# Patient Record
Sex: MALE
[De-identification: names, ages, dates, MRNs, and addresses within clinical notes are randomized per-mention and may not be internally consistent; named-entity substitution may affect disease eponyms.]

---

## 2018-12-17 ENCOUNTER — HOSPITAL ENCOUNTER (EMERGENCY)
Dept: HOSPITAL 42 - ED | Age: 28
Discharge: HOME | End: 2018-12-17
Payer: COMMERCIAL

## 2018-12-17 VITALS — OXYGEN SATURATION: 100 %

## 2018-12-17 VITALS
HEART RATE: 68 BPM | RESPIRATION RATE: 16 BRPM | DIASTOLIC BLOOD PRESSURE: 69 MMHG | TEMPERATURE: 98 F | SYSTOLIC BLOOD PRESSURE: 126 MMHG

## 2018-12-17 DIAGNOSIS — T78.40XA: Primary | ICD-10-CM

## 2018-12-17 NOTE — ED PDOC
Arrival/HPI





- History of Present Illness


Narrative History of Present Illness (Text): 





12/17/18 08:23


28M with a Past medical history of peanut allergy opresents to the Emergency 

department with a one day hx of facial swelling after taking a shot of tequila. 

Pt says cheaop tequila gives him an allergic reaction that rtesults in facial 

swelling, eyes puffiness and tears as well as throat swelling. Pt doesnt report 

taking any allergy medications after the onset of his reaction, he drank more 

beer and took another shot of alcohol instead. The reaction started at 7pm, pt 

went to sleep at 11pm, says he has been improving slightly. When he wo0ke up 

this morning his family encouraged him to come in. 


12/17/18 08:52








<Guerrero Marcus - Last Filed: 12/17/18 08:38>





<Anastasiia Taylor - Last Filed: 12/17/18 17:25>





- General


Chief Complaint: Allergic Reaction


Time Seen by Provider: 12/17/18 08:12





Past Medical History





- Provider Review


Nursing Documentation Reviewed: Yes





- Infectious Disease


Hx of Infectious Diseases: None





- Cardiac


Hx Cardiac Disorders: No





- Pulmonary


Hx Respiratory Disorders: No





- Neurological


Hx Neurological Disorder: No





- Hematological/Oncological


Hx Blood Disorders: No





- Psychiatric


Hx Substance Use: No





- Anesthesia


Hx Anesthesia: No





<Guerrero Marcus - Last Filed: 12/17/18 08:38>





Family/Social History





- Physician Review


Nursing Documentation Reviewed: Yes


Smoking Status: Unknown If Ever Smoked


Hx Alcohol Use: No


Hx Substance Use: No





<Guerrero Marcus - Last Filed: 12/17/18 08:38>





- Physician Review


Nursing Documentation Reviewed: Yes


Family/Social History: No Known Family HX





<Anastasiia Taylor - Last Filed: 12/17/18 17:25>





Allergies/Home Meds





<Guerrero Marcus - Last Filed: 12/17/18 08:38>





<Anastasiia Taylor - Last Filed: 12/17/18 17:25>


Allergies/Adverse Reactions: 


Allergies





peanut Allergy (Verified 12/17/18 08:46)


   ANAPHYLAXIS











Review of Systems





- Review of Systems


Constitutional: Normal.  absent: Fatigue, Weight Change, Fevers, Night Sweats, 

Other


Eyes: Normal (excessive lacrimation).  absent: Vision Changes, Photophobia, Eye 

Pain, Other


ENT: Voice Changes, Rhinorrhea


Respiratory: Cough, Sputum.  absent: SOB


Cardiovascular: Normal.  absent: Chest Pain


Gastrointestinal: Normal.  absent: Abdominal Pain, Stool Changes, Constipation, 

Diarrhea, Nausea, Vomiting, Appetite Changes, Hematochezia, Hematemesis, 

Anorexia, Food Intolerance, Other


Genitourinary Male: Normal.  absent: Dysuria, Frequency, Hematuria, Urinary 

Output Changes, Other


Musculoskeletal: Normal.  absent: Arthralgias, Back Pain, Neck Pain, Joint 

Swelling, Myalgias, Other


Skin: Normal.  absent: Rash, Pruritis, Skin Lesions, Laceration, Abscess, Ulcer,

Cellulitis, Other


Neurological: Normal.  absent: Headache, Dizziness, Focal Weakness, Gait 

Changes, Speech Changes, SC, Facial Droop, DE, Disequilibrium, SE, Seizure, 

Other


Psychiatric: Normal.  absent: Anxiety, Depression, Suicidal Ideation, Other





<Guerrero Marcus - Last Filed: 12/17/18 08:38>





Physical Exam





Vital Signs











  Temp Pulse Resp BP Pulse Ox


 


 12/17/18 08:01  98.2 F  84  18  143/86  100














- Systems Exam


Head: Present: Atraumatic, Normocephalic, Swelling (facial swelling).  No: 

Ecchymosis


Pupils: Present: PERRL


Extroacular Muscles: Present: EOMI


Conjunctiva: Present: Injected


Mouth: Present: Moist Mucous Membranes, Normal Tounge.  No: Drooling


Pharnyx: Present: Muffled/Hoarse Voice, Other (tonsils removed).  No: ERYTHEMA, 

Strider


Respiratory/Chest: Present: Clear to Auscultation.  No: Respiratory Distress, 

Wheezes, Rales, Rhonchi, Tachypneic


Cardiovascular: Present: Regular Rate and Rhythm, Normal S1, S2.  No: Murmurs


Abdomen: No: Tenderness, Distention, Normal Bowel Sounds


Neurological: Present: GCS=15, CN II-XII Intact, Speech Normal


Skin: Present: Warm, Dry, Normal Color.  No: Rashes


Psychiatric: Present: Oriented x 3, Normal Insight, Normal Concentration





<Guerrero Marcus - Last Filed: 12/17/18 08:38>





Vital Signs











  Temp Pulse Resp BP Pulse Ox


 


 12/17/18 09:27  98 F  68  16  126/69  100


 


 12/17/18 08:01  98.2 F  84  18  143/86  100














<Anastasiia Taylor - Last Filed: 12/17/18 17:25>





Medical Decision Making





- Medication Orders


Current Medication Orders: 





12/17/18 08:51


Pednisone oral 60mg today, 40mg for 4 days





Pepcid 20mg oral take once daily for 5 days


Benadryl 25mg oral take once daily for 5 days





<Guerrero Marcus - Last Filed: 12/17/18 08:38>


ED Course and Treatment: 








A 28 year old male who presents to the emergency department with a complaint of 

facial swelling s/p drinking tequila. In agreement with resident note, which 

includes further HPI details. Patient was seen and evaluated with resident, came

up with plan and treatment together.





- Medication Orders


Current Medication Orders: 














Discontinued Medications





Prednisone (Prednisone Tab)  60 mg PO STAT ONE


   Stop: 12/17/18 08:59


   Last Admin: 12/17/18 09:22  Dose: 60 mg











<Anastasiia Taylor - Last Filed: 12/17/18 17:25>





- PA / NP / Resident Statement


MD/ has examined the patient and agrees with the treatment plan.





<Anastasiia Taylor - Last Filed: 12/17/18 17:25>





Disposition/Present on Arrival





- Present on Arrival


Any Indicators Present on Arrival: No


History of DVT/PE: No


History of Uncontrolled Diabetes: No


Urinary Catheter: No


History of Decub. Ulcer: No


History Surgical Site Infection Following: None





- Disposition


Have Diagnosis and Disposition been Completed?: Yes


Disposition Time: 08:56


Patient Plan: Discharge





<Guerrero Marcus - Last Filed: 12/17/18 08:38>





<Anastasiia Taylor - Last Filed: 12/17/18 17:25>





- Disposition


Diagnosis: 


 Allergic reaction





Disposition: HOME/ ROUTINE


Condition: STABLE


Discharge Instructions (ExitCare):  Hives


Prescriptions: 


predniSONE [Prednisone] 20 mg PO DAILY #8 tab


Forms:  [employer] (English)